# Patient Record
Sex: FEMALE | Race: WHITE | NOT HISPANIC OR LATINO | Employment: FULL TIME | URBAN - METROPOLITAN AREA
[De-identification: names, ages, dates, MRNs, and addresses within clinical notes are randomized per-mention and may not be internally consistent; named-entity substitution may affect disease eponyms.]

---

## 2018-04-05 ENCOUNTER — TELEPHONE (OUTPATIENT)
Dept: FAMILY MEDICINE | Facility: CLINIC | Age: 43
End: 2018-04-05

## 2018-06-12 ENCOUNTER — HOSPITAL ENCOUNTER (EMERGENCY)
Facility: CLINIC | Age: 43
Discharge: HOME | End: 2018-06-12
Attending: PHYSICIAN ASSISTANT | Admitting: PHYSICIAN ASSISTANT
Payer: COMMERCIAL

## 2018-06-12 VITALS
HEART RATE: 80 BPM | SYSTOLIC BLOOD PRESSURE: 128 MMHG | WEIGHT: 139 LBS | TEMPERATURE: 98.4 F | BODY MASS INDEX: 23.13 KG/M2 | RESPIRATION RATE: 14 BRPM | OXYGEN SATURATION: 99 % | DIASTOLIC BLOOD PRESSURE: 92 MMHG

## 2018-06-12 DIAGNOSIS — N39.0 URINARY TRACT INFECTION WITH HEMATURIA, SITE UNSPECIFIED: Primary | ICD-10-CM

## 2018-06-12 DIAGNOSIS — R31.9 URINARY TRACT INFECTION WITH HEMATURIA, SITE UNSPECIFIED: Primary | ICD-10-CM

## 2018-06-12 LAB
ALBUMIN UR-MCNC: NEGATIVE MG/DL
APPEARANCE UR: ABNORMAL
BACTERIA #/AREA URNS HPF: ABNORMAL /HPF
BILIRUB UR QL STRIP: NEGATIVE
COLOR UR AUTO: YELLOW
GLUCOSE UR STRIP-MCNC: NEGATIVE MG/DL
HGB UR QL STRIP: ABNORMAL
KETONES UR STRIP-MCNC: NEGATIVE MG/DL
LEUKOCYTE ESTERASE UR QL STRIP: ABNORMAL
MUCOUS THREADS #/AREA URNS LPF: PRESENT /LPF
NITRATE UR QL: NEGATIVE
PH UR STRIP: 6 PH (ref 5–7)
RBC #/AREA URNS AUTO: 3 /HPF (ref 0–2)
SOURCE: ABNORMAL
SP GR UR STRIP: 1 (ref 1–1.03)
SQUAMOUS #/AREA URNS AUTO: <1 /HPF (ref 0–1)
UROBILINOGEN UR STRIP-MCNC: 0 MG/DL (ref 0–2)
WBC #/AREA URNS AUTO: 125 /HPF (ref 0–5)
WBC CLUMPS #/AREA URNS HPF: PRESENT /HPF

## 2018-06-12 PROCEDURE — 99214 OFFICE O/P EST MOD 30 MIN: CPT | Mod: Z6 | Performed by: PHYSICIAN ASSISTANT

## 2018-06-12 PROCEDURE — 87086 URINE CULTURE/COLONY COUNT: CPT | Performed by: PHYSICIAN ASSISTANT

## 2018-06-12 PROCEDURE — G0463 HOSPITAL OUTPT CLINIC VISIT: HCPCS | Performed by: PHYSICIAN ASSISTANT

## 2018-06-12 PROCEDURE — 87186 SC STD MICRODIL/AGAR DIL: CPT | Performed by: PHYSICIAN ASSISTANT

## 2018-06-12 PROCEDURE — 87088 URINE BACTERIA CULTURE: CPT | Performed by: PHYSICIAN ASSISTANT

## 2018-06-12 PROCEDURE — 81001 URINALYSIS AUTO W/SCOPE: CPT | Performed by: PHYSICIAN ASSISTANT

## 2018-06-12 RX ORDER — SULFAMETHOXAZOLE/TRIMETHOPRIM 800-160 MG
1 TABLET ORAL 2 TIMES DAILY
Qty: 14 TABLET | Refills: 0 | Status: SHIPPED | OUTPATIENT
Start: 2018-06-12 | End: 2018-06-19

## 2018-06-12 RX ORDER — PHENAZOPYRIDINE HYDROCHLORIDE 200 MG/1
200 TABLET, FILM COATED ORAL 3 TIMES DAILY
Qty: 9 TABLET | Refills: 0 | Status: SHIPPED | OUTPATIENT
Start: 2018-06-12 | End: 2018-06-15

## 2018-06-12 RX ORDER — FLUCONAZOLE 150 MG/1
TABLET ORAL
Qty: 2 TABLET | Refills: 0 | Status: SHIPPED | OUTPATIENT
Start: 2018-06-12 | End: 2018-06-15

## 2018-06-12 NOTE — ED AVS SNAPSHOT
[facility] Emergency Department    [address] [facility] [address]    Phone:  [phone]    Fax:  [phone]                                       Brad Leo   MRN: [id number]    Department:  [facility] Emergency Department   Date of Visit:  6/12/2018           Patient Information     Date Of Birth          1975        Your diagnoses for this visit were:     Urinary tract infection with hematuria, site unspecified        You were seen by Kiki Brewer PA-C.      Follow-up Information     Follow up with [facility]. Call in 5 days.    Specialty:  Family Practice    Why:  As needed, For persistent symptoms    Contact information:    [address] [facility] [address] [phone]    Additional information:    The medical center is located at   [address] [place] [address] (between [facility] and   [facility] [address] [place] [address], four miles north   of [place]).        Follow up with [facility] Emergency Department.    Specialty:  EMERGENCY MEDICINE    Why:  As needed, If symptoms worsen    Contact information:    [address] [facility] [address] [phone]    Additional information:    The medical center is located at   [address] [place] [address] (between [facility] and   [address], four miles north   of [place]).        Discharge Instructions         Urinary Tract Infections in Women    Urinary tract infections (UTIs) are most often caused by bacteria. These bacteria enter the urinary tract. The bacteria may come from outside the body. Or they may travel from the skin outside the rectum or vagina into the urethra. Female anatomy makes it easy for bacteria from the bowel to enter a woman s urinary tract, which is the most common source of UTI. This means women develop UTIs more often than men. Pain in or around the urinary tract is a common UTI symptom. But the only way to know for sure if you have a UTI for the healthcare provider to test your  urine. The two tests that may be done are the urinalysis and urine culture.  Types of UTIs    Cystitis. A bladder infection (cystitis) is the most common UTI in women. You may have urgent or frequent urination. You may also have pain, burning when you urinate, and bloody urine.    Urethritis. This is an inflamed urethra, which is the tube that carries urine from the bladder to outside the body. You may have lower stomach or back pain. You may also have urgent or frequent urination.    Pyelonephritis. This is a kidney infection. If not treated, it can be serious and damage your kidneys. In severe cases, you may need to stay in the hospital. You may have a fever and lower back pain.  Medicines to treat a UTI  Most UTIs are treated with antibiotics. These kill the bacteria. The length of time you need to take them depends on the type of infection. It may be as short as 3 days. If you have repeated UTIs, you may need a low-dose antibiotic for several months. Take antibiotics exactly as directed. Don t stop taking them until all of the medicine is gone. If you stop taking the antibiotic too soon, the infection may not go away. You may also develop a resistance to the antibiotic. This can make it much harder to treat.  Lifestyle changes to treat and prevent UTIs  The lifestyle changes below will help get rid of your UTI. They may also help prevent future UTIs.    Drink plenty of fluids. This includes water, juice, or other caffeine-free drinks. Fluids help flush bacteria out of your body.    Empty your bladder. Always empty your bladder when you feel the urge to urinate. And always urinate before going to sleep. Urine that stays in your bladder can lead to infection. Try to urinate before and after sex as well.    Practice good personal hygiene. Wipe yourself from front to back after using the toilet. This helps keep bacteria from getting into the urethra.    Use condoms during sex. These help prevent UTIs caused by  sexually transmitted bacteria. Also don't use spermicides during sex. These can increase the risk for UTIs. Choose other forms of birth control instead. For women who tend to get UTIs after sex, a low-dose of a preventive antibiotic may be used. Be sure to discuss this option with your healthcare provider.    Follow up with your healthcare provider as directed. He or she may test to make sure the infection has cleared. If needed, more treatment may be started.  Date Last Reviewed: 1/1/2017 2000-2017 [employer]. [address] [place] [address]. All rights reserved. This information is not intended as a substitute for professional medical care. Always follow your healthcare professional's instructions.          24 Hour Appointment Hotline       To make an appointment at any [facility], call [phone] ([phone]). If you don't have a family doctor or clinic, we will help you find one. [facility] clinics are conveniently located to serve the needs of you and your family.             Review of your medicines      START taking        Dose / Directions Last dose taken    fluconazole 150 MG tablet   Commonly known as:  DIFLUCAN   Quantity:  2 tablet        Take one tablet now, and one tablet in three days   Refills:  0        phenazopyridine 200 MG tablet   Commonly known as:  PYRIDIUM   Dose:  200 mg   Quantity:  9 tablet        Take 1 tablet (200 mg) by mouth 3 times daily for 3 days   Refills:  0        sulfamethoxazole-trimethoprim 800-160 MG per tablet   Commonly known as:  BACTRIM DS   Dose:  1 tablet   Quantity:  14 tablet        Take 1 tablet by mouth 2 times daily for 7 days   Refills:  0          Our records show that you are taking the medicines listed below. If these are incorrect, please call your family doctor or clinic.        Dose / Directions Last dose taken    LORAZEPAM PO   Dose:  1 mg        Take 1 mg by mouth every 8 hours as needed for anxiety   Refills:  0                 Prescriptions were sent or printed at these locations (3 Prescriptions)                   [facility] [address] [facility] [employer] - [place], [place] - [facility] [address] [facility]   [address] [facility] [address]    Telephone:  [phone]   Fax:  [phone]   Hours:                  E-Prescribed (3 of 3)         fluconazole (DIFLUCAN) 150 MG tablet               phenazopyridine (PYRIDIUM) 200 MG tablet               sulfamethoxazole-trimethoprim (BACTRIM DS) 800-160 MG per tablet                Procedures and tests performed during your visit     UA with Microscopic    Urine Culture      Orders Needing Specimen Collection     None      Pending Results     Date and Time Order Name Status Description    6/12/2018 1806 Urine Culture In process             Pending Culture Results     Date and Time Order Name Status Description    6/12/2018 1806 Urine Culture In process             Pending Results Instructions     If you had any lab results that were not finalized at the time of your Discharge, you can call the ED Lab Result RN at [phone]. You will be contacted by this team for any positive Lab results or changes in treatment. The nurses are available 7 days a week from 10A to 6:30P.  You can leave a message 24 hours per day and they will return your call.        Test Results From Your Hospital Stay        6/12/2018  6:12 PM         6/12/2018  6:43 PM      Component Results     Component Value Ref Range & Units Status    Color Urine Yellow  Final    Appearance Urine Slightly Cloudy  Final    Glucose Urine Negative NEG^Negative mg/dL Final    Bilirubin Urine Negative NEG^Negative Final    Ketones Urine Negative NEG^Negative mg/dL Final    Specific Gravity Urine 1.004 1.003 - 1.035 Final    Blood Urine Large (A) NEG^Negative Final    pH Urine 6.0 5.0 - 7.0 pH Final    Protein Albumin Urine Negative NEG^Negative mg/dL Final    Urobilinogen mg/dL 0.0 0.0 - 2.0 mg/dL Final    Nitrite Urine Negative NEG^Negative Final     "Leukocyte Esterase Urine Large (A) NEG^Negative Final    Source Midstream Urine  Final    WBC Urine 125 (H) 0 - 5 /HPF Final    RBC Urine 3 (H) 0 - 2 /HPF Final    WBC Clumps Present (A) NEG^Negative /HPF Final    Bacteria Urine Few (A) NEG^Negative /HPF Final    Squamous Epithelial /HPF Urine <1 0 - 1 /HPF Final    Mucous Urine Present (A) NEG^Negative /LPF Final                Thank you for choosing [facility]       Thank you for choosing [facility] for your care. Our goal is always to provide you with excellent care. Hearing back from our patients is one way we can continue to improve our services. Please take a few minutes to complete the written survey that you may receive in the mail after you visit with us. Thank you!        [employer] Information     [employer] lets you send messages to your doctor, view your test results, renew your prescriptions, schedule appointments and more. To sign up, go to [email or web address] . Click on \"Log in\" on the left side of the screen, which will take you to the Welcome page. Then click on \"Sign up Now\" on the right side of the page.     You will be asked to enter the access code listed below, as well as some personal information. Please follow the directions to create your username and password.     Your access code is: [email or web address]  Expires: 9/10/2018  7:41 PM     Your access code will  in 90 days. If you need help or a new code, please call your [facility] clinic or [phone].        Care EveryWhere ID     This is your Care EveryWhere ID. This could be used by other organizations to access your [facility] medical records  [id number]        Equal Access to Services     [facility]: Hadii shayla kamara Sovelma, waaxda luqadaha, qaybgus kaalmada erwin, gonzalo whitehead. So [facility] [phone].    ATENCIÓN: Si habla español, tiene a culver disposición servicios gratuitos de asistencia lingüística. Llame al [phone].    We comply with applicable " federal civil rights laws and [place] laws. We do not discriminate on the basis of race, color, national origin, age, disability, sex, sexual orientation, or gender identity.            After Visit Summary       This is your record. Keep this with you and show to your community pharmacist(s) and doctor(s) at your next visit.

## 2018-06-12 NOTE — ED AVS SNAPSHOT
[facility] Emergency Department    [address] [facility] [address]    Phone:  [phone]    Fax:  [phone]                                       Brad Leo   MRN: [id number]    Department:  [facility] Emergency Department   Date of Visit:  6/12/2018           After Visit Summary Signature Page     I have received my discharge instructions, and my questions have been answered. I have discussed any challenges I see with this plan with the nurse or doctor.    ..........................................................................................................................................  Patient/Patient Representative Signature      ..........................................................................................................................................  Patient Representative Print Name and Relationship to Patient    ..................................................               ................................................  Date                                            Time    ..........................................................................................................................................  Reviewed by Signature/Title    ...................................................              ..............................................  Date                                                            Time

## 2018-06-13 ASSESSMENT — ENCOUNTER SYMPTOMS
DYSURIA: 1
ABDOMINAL PAIN: 1
CHILLS: 0
HEMATURIA: 1
FEVER: 0
CONSTITUTIONAL NEGATIVE: 1
FREQUENCY: 1
MUSCULOSKELETAL NEGATIVE: 1

## 2018-06-13 NOTE — ED PROVIDER NOTES
History     Chief Complaint   Patient presents with     UTI     HPI  Brad Leo is a 43 year old female who presents with complaints of dysuria, hematuria, and increased urinary urgency and frequency that started today.  Patient reports having history of urinary tract infections and the symptoms feel similar.  He also complains of some mild suprapubic discomfort.  She denies fevers, chills, nausea, vomiting, or back or flank pain.  Patient has history of hysterectomy.      Problem List:    Patient Active Problem List    Diagnosis Date Noted     Hx MRSA infection 11/03/2014     Priority: Medium     S/P hysterectomy 11/03/2014     Priority: Medium     benign          Past Medical History:    No past medical history on file.    Past Surgical History:    Past Surgical History:   Procedure Laterality Date     HYSTERECTOMY  ?    partial       Family History:    Family History   Problem Relation Age of Onset     Unknown/Adopted Father      Cancer - colorectal Maternal Grandmother      Hypertension Maternal Grandmother      Unknown/Adopted Paternal Grandmother      Unknown/Adopted Paternal Grandfather        Social History:  Marital Status:   [2]  Social History   Substance Use Topics     Smoking status: Former Smoker     Smokeless tobacco: Never Used     Alcohol use Yes        Medications:      fluconazole (DIFLUCAN) 150 MG tablet   phenazopyridine (PYRIDIUM) 200 MG tablet   sulfamethoxazole-trimethoprim (BACTRIM DS) 800-160 MG per tablet   LORAZEPAM PO         Review of Systems   Constitutional: Negative.  Negative for chills and fever.   Gastrointestinal: Positive for abdominal pain (suprapubic).   Genitourinary: Positive for dysuria, frequency, hematuria and urgency.   Musculoskeletal: Negative.    Skin: Negative.    All other systems reviewed and are negative.      Physical Exam   BP: (!) 128/92  Pulse: 80  Temp: 98.4  F (36.9  C)  Resp: 14  Weight: 63 kg (139 lb)  SpO2: 99 %      Physical Exam    Constitutional: She appears well-developed and well-nourished. No distress.   HENT:   Head: Normocephalic and atraumatic.   Pulmonary/Chest: Effort normal.   Abdominal: Soft. She exhibits no distension. There is no tenderness. There is no rebound, no guarding and no CVA tenderness.   Neurological: She is alert.   Skin: Skin is warm and dry.       ED Course     ED Course     Procedures      Results for orders placed or performed during the hospital encounter of 06/12/18 (from the past 24 hour(s))   Urine Culture   Result Value Ref Range    Specimen Description Midstream Urine     Special Requests Specimen received in preservative     Culture Micro (A)      >100,000 colonies/mL  Lactose fermenting gram negative rods      Culture Micro Susceptibility testing in progress    UA with Microscopic   Result Value Ref Range    Color Urine Yellow     Appearance Urine Slightly Cloudy     Glucose Urine Negative NEG^Negative mg/dL    Bilirubin Urine Negative NEG^Negative    Ketones Urine Negative NEG^Negative mg/dL    Specific Gravity Urine 1.004 1.003 - 1.035    Blood Urine Large (A) NEG^Negative    pH Urine 6.0 5.0 - 7.0 pH    Protein Albumin Urine Negative NEG^Negative mg/dL    Urobilinogen mg/dL 0.0 0.0 - 2.0 mg/dL    Nitrite Urine Negative NEG^Negative    Leukocyte Esterase Urine Large (A) NEG^Negative    Source Midstream Urine     WBC Urine 125 (H) 0 - 5 /HPF    RBC Urine 3 (H) 0 - 2 /HPF    WBC Clumps Present (A) NEG^Negative /HPF    Bacteria Urine Few (A) NEG^Negative /HPF    Squamous Epithelial /HPF Urine <1 0 - 1 /HPF    Mucous Urine Present (A) NEG^Negative /LPF       Medications - No data to display    Assessments & Plan (with Medical Decision Making)     Pt is a 43 year old female who presents with complaints of dysuria, hematuria, and increased urinary urgency and frequency that started today.  Patient reports having history of urinary tract infections and the symptoms feel similar.  He also complains of some mild  suprapubic discomfort.  Pt is afebrile on arrival.  Exam as above.  UA was positive for large leuk esterase, 125 WBCs, WBC clumps.  Urine was sent for culture.  Discussed results with patient.  Return precautions were reviewed.  Hand-outs were provided.    Patient was sent with Bactrim, Pyridium, and Diflucan (per patient's request that she frequently gets a yeast infection with antibiotic usage) and was instructed to follow-up with PCP if no improvement in 5-7 days for continued care and management or sooner if new or worsening symptoms.  She is to return to the ED for persistent and/or worsening symptoms.  Patient expressed understanding of the diagnosis and plan and was discharged home in good condition.    I have reviewed the nursing notes.    I have reviewed the findings, diagnosis, plan and need for follow up with the patient.    Discharge Medication List as of 6/12/2018  7:41 PM      START taking these medications    Details   fluconazole (DIFLUCAN) 150 MG tablet Take one tablet now, and one tablet in three days, Disp-2 tablet, R-0, E-Prescribe      phenazopyridine (PYRIDIUM) 200 MG tablet Take 1 tablet (200 mg) by mouth 3 times daily for 3 days, Disp-9 tablet, R-0, E-Prescribe      sulfamethoxazole-trimethoprim (BACTRIM DS) 800-160 MG per tablet Take 1 tablet by mouth 2 times daily for 7 days, Disp-14 tablet, R-0, E-Prescribe             Final diagnoses:   Urinary tract infection with hematuria, site unspecified       6/12/2018   [facility] EMERGENCY DEPARTMENT     Kiki Brewer PA-C  06/13/18 1300

## 2018-06-13 NOTE — DISCHARGE INSTRUCTIONS
Urinary Tract Infections in Women    Urinary tract infections (UTIs) are most often caused by bacteria. These bacteria enter the urinary tract. The bacteria may come from outside the body. Or they may travel from the skin outside the rectum or vagina into the urethra. Female anatomy makes it easy for bacteria from the bowel to enter a woman s urinary tract, which is the most common source of UTI. This means women develop UTIs more often than men. Pain in or around the urinary tract is a common UTI symptom. But the only way to know for sure if you have a UTI for the healthcare provider to test your urine. The two tests that may be done are the urinalysis and urine culture.  Types of UTIs    Cystitis. A bladder infection (cystitis) is the most common UTI in women. You may have urgent or frequent urination. You may also have pain, burning when you urinate, and bloody urine.    Urethritis. This is an inflamed urethra, which is the tube that carries urine from the bladder to outside the body. You may have lower stomach or back pain. You may also have urgent or frequent urination.    Pyelonephritis. This is a kidney infection. If not treated, it can be serious and damage your kidneys. In severe cases, you may need to stay in the hospital. You may have a fever and lower back pain.  Medicines to treat a UTI  Most UTIs are treated with antibiotics. These kill the bacteria. The length of time you need to take them depends on the type of infection. It may be as short as 3 days. If you have repeated UTIs, you may need a low-dose antibiotic for several months. Take antibiotics exactly as directed. Don t stop taking them until all of the medicine is gone. If you stop taking the antibiotic too soon, the infection may not go away. You may also develop a resistance to the antibiotic. This can make it much harder to treat.  Lifestyle changes to treat and prevent UTIs  The lifestyle changes below will help get rid of your UTI. They  may also help prevent future UTIs.    Drink plenty of fluids. This includes water, juice, or other caffeine-free drinks. Fluids help flush bacteria out of your body.    Empty your bladder. Always empty your bladder when you feel the urge to urinate. And always urinate before going to sleep. Urine that stays in your bladder can lead to infection. Try to urinate before and after sex as well.    Practice good personal hygiene. Wipe yourself from front to back after using the toilet. This helps keep bacteria from getting into the urethra.    Use condoms during sex. These help prevent UTIs caused by sexually transmitted bacteria. Also don't use spermicides during sex. These can increase the risk for UTIs. Choose other forms of birth control instead. For women who tend to get UTIs after sex, a low-dose of a preventive antibiotic may be used. Be sure to discuss this option with your healthcare provider.    Follow up with your healthcare provider as directed. He or she may test to make sure the infection has cleared. If needed, more treatment may be started.  Date Last Reviewed: 1/1/2017 2000-2017 [employer]. [address] [place] [address]. All rights reserved. This information is not intended as a substitute for professional medical care. Always follow your healthcare professional's instructions.

## 2018-06-14 LAB
BACTERIA SPEC CULT: ABNORMAL
Lab: ABNORMAL
SPECIMEN SOURCE: ABNORMAL

## 2018-06-16 ENCOUNTER — TELEPHONE (OUTPATIENT)
Dept: EMERGENCY MEDICINE | Facility: CLINIC | Age: 43
End: 2018-06-16

## 2018-06-16 NOTE — TELEPHONE ENCOUNTER
[facility] Emergency Department Lab result notification:    [facility] ED lab result protocol used  Urine Culture Protocol    Reason for call  Notify of lab results, assess symptoms,  review ED providers recommendations/discharge instructions (if necessary) and advise per ED lab result f/u protocol    Lab Result  Final Urine Culture Report on 6/14/18  Emergency Dept discharge antibiotic prescribed: Sulfamethoxazole-Trimethoprim (Bactrim DS, Septra DS) 800-160 mg PO tablet, 1 tablet by mouth 2 times daily for 7 days  #1. Bacteria, >100,000 colonies/mL Escherichia coli, is SUSCEPTIBLE to Antibiotic.    As per [facility] ED Lab Result protocol, no change in antibiotic therapy.  Information table from ED Provider visit on 06/12/2018  Symptoms reported at ED visit (Chief complaint, HPI) a 43 year old female who presents with complaints of dysuria, hematuria, and increased urinary urgency and frequency that started today.  Patient reports having history of urinary tract infections and the symptoms feel similar.  He also complains of some mild suprapubic discomfort.  She denies fevers, chills, nausea, vomiting, or back or flank pain.  Patient has history of hysterectomy.   ED providers Impression and Plan (applicable information) a 43 year old female who presents with complaints of dysuria, hematuria, and increased urinary urgency and frequency that started today.  Patient reports having history of urinary tract infections and the symptoms feel similar.  He also complains of some mild suprapubic discomfort.  Pt is afebrile on arrival.  Exam as above.  UA was positive for large leuk esterase, 125 WBCs, WBC clumps.  Urine was sent for culture.  Discussed results with patient.  Return precautions were reviewed.  Hand-outs were provided.     Patient was sent with Bactrim, Pyridium, and Diflucan (per patient's request that she frequently gets a yeast infection with antibiotic usage) and was instructed to follow-up with PCP if  no improvement in 5-7 days for continued care and management or sooner if new or worsening symptoms.  She is to return to the ED for persistent and/or worsening symptoms.  Patient expressed understanding of the diagnosis and plan and was discharged home in good condition   Miscellaneous information Follow up with [facility]. Call in 5 days. As needed and for persistent symptoms.      RN Assessment (Patient s current Symptoms), include time called.  [Insert Left message here if message left]  At 1449 pt notes improvement in her symptoms highlighted above.   RN Recommendations/Instructions per [facility] ED lab result protocol  Continue antibiotics as prescribed and     Please Contact your PCP clinic or return to the Emergency department if your:    Symptoms worsen or other concerning symptom's.    PCP follow-up Questions asked: NO    Wilma Brown RN  [facility] RN  Lung Nodule and ED Lab Result F/u RN  Epic pool (ED late result f/u RN): [id number]  FV INCIDENTAL RADIOLOGY F/U NURSES: P [id number]  # [phone]      Copy of Lab result   Exam Information   Exam Date Exam Time Accession # Results    6/12/18  6:06 PM [id number]    Component Results   Component Collected Lab   Specimen Description 06/12/2018  6:06    Midstream Urine   Special Requests 06/12/2018  6:06 PM 75   Specimen received in preservative   Culture Micro (Abnormal) 06/12/2018  6:06    >100,000 colonies/mL   Escherichia coli      Culture & Susceptibility   ESCHERICHIA COLI   Antibiotic Interpretation Sensitivity Unit Method Status   AMPICILLIN Sensitive 8 ug/mL ANUPAMA Final   AMPICILLIN/SULBACTAM Sensitive 4 ug/mL ANUPAMA Final   CEFAZOLIN Sensitive <=4 ug/mL ANUPAMA Final   Comment: Cefazolin ANUPAMA breakpoints are for the treatment of uncomplicated urinary tract   infections.  For the treatment of systemic infections, please contact the   laboratory for additional testing.   CEFEPIME Sensitive <=1 ug/mL ANUPAMA Final    CEFOXITIN Sensitive 8 ug/mL ANUPAMA Final   CEFTAZIDIME Sensitive <=1 ug/mL ANUPAMA Final   CEFTRIAXONE Sensitive <=1 ug/mL ANUPAMA Final   CIPROFLOXACIN Sensitive <=0.25 ug/mL ANUPAMA Final   GENTAMICIN Sensitive <=1 ug/mL ANUPAMA Final   LEVOFLOXACIN Sensitive <=0.12 ug/mL ANUPAMA Final   NITROFURANTOIN Sensitive <=16 ug/mL ANUPAMA Final   Piperacillin/Tazo Sensitive <=4 ug/mL ANUPAMA Final   TOBRAMYCIN Sensitive <=1 ug/mL ANUPAMA Final   Trimethoprim/Sulfa Sensitive <=1/19 ug/mL ANUPAMA Final

## 2021-05-31 ENCOUNTER — RECORDS - HEALTHEAST (OUTPATIENT)
Dept: ADMINISTRATIVE | Facility: CLINIC | Age: 46
End: 2021-05-31

## 2021-08-28 ENCOUNTER — HOSPITAL ENCOUNTER (OUTPATIENT)
Facility: AMBULATORY SURGERY CENTER | Age: 46
End: 2021-08-28
Attending: OBSTETRICS & GYNECOLOGY
Payer: COMMERCIAL

## 2021-08-29 DIAGNOSIS — Z11.59 ENCOUNTER FOR SCREENING FOR OTHER VIRAL DISEASES: ICD-10-CM

## 2021-09-10 ENCOUNTER — LAB (OUTPATIENT)
Dept: LAB | Facility: CLINIC | Age: 46
End: 2021-09-10
Attending: OBSTETRICS & GYNECOLOGY
Payer: COMMERCIAL

## 2021-09-10 DIAGNOSIS — Z11.59 ENCOUNTER FOR SCREENING FOR OTHER VIRAL DISEASES: ICD-10-CM

## 2021-09-10 PROCEDURE — U0005 INFEC AGEN DETEC AMPLI PROBE: HCPCS

## 2021-09-10 PROCEDURE — U0003 INFECTIOUS AGENT DETECTION BY NUCLEIC ACID (DNA OR RNA); SEVERE ACUTE RESPIRATORY SYNDROME CORONAVIRUS 2 (SARS-COV-2) (CORONAVIRUS DISEASE [COVID-19]), AMPLIFIED PROBE TECHNIQUE, MAKING USE OF HIGH THROUGHPUT TECHNOLOGIES AS DESCRIBED BY CMS-2020-01-R: HCPCS

## 2021-09-10 RX ORDER — CELECOXIB 200 MG/1
CAPSULE ORAL
COMMUNITY
Start: 2021-08-30

## 2021-09-10 RX ORDER — GABAPENTIN 300 MG/1
CAPSULE ORAL
COMMUNITY
Start: 2021-07-15

## 2021-09-10 RX ORDER — NARATRIPTAN 2.5 MG/1
2.5 TABLET ORAL
COMMUNITY
Start: 2020-07-09

## 2021-09-10 RX ORDER — FLUCONAZOLE 150 MG/1
TABLET ORAL
COMMUNITY
Start: 2021-09-07

## 2021-09-10 RX ORDER — METHYLPREDNISOLONE 4 MG
TABLET, DOSE PACK ORAL SEE ADMIN INSTRUCTIONS
COMMUNITY
Start: 2021-08-30

## 2021-09-10 RX ORDER — TIZANIDINE 2 MG/1
TABLET ORAL
COMMUNITY
Start: 2021-08-30

## 2021-09-10 RX ORDER — LORAZEPAM 0.5 MG/1
TABLET ORAL
COMMUNITY
Start: 2021-04-26

## 2021-09-10 RX ORDER — FAMCICLOVIR 500 MG/1
TABLET ORAL
COMMUNITY
Start: 2021-09-09

## 2021-09-10 RX ORDER — CYCLOBENZAPRINE HCL 10 MG
TABLET ORAL
COMMUNITY
Start: 2021-02-04

## 2021-09-10 RX ORDER — ESTRADIOL 10 UG/1
INSERT VAGINAL
COMMUNITY
Start: 2021-06-29

## 2021-09-10 RX ORDER — ECONAZOLE NITRATE 10 MG/G
CREAM TOPICAL
COMMUNITY
Start: 2020-01-19

## 2021-09-10 RX ORDER — OXYBUTYNIN CHLORIDE 5 MG/1
5 TABLET, EXTENDED RELEASE ORAL DAILY
COMMUNITY
Start: 2021-08-26 | End: 2021-09-14

## 2021-09-11 LAB — SARS-COV-2 RNA RESP QL NAA+PROBE: NEGATIVE

## 2021-09-13 ENCOUNTER — ANESTHESIA EVENT (OUTPATIENT)
Dept: SURGERY | Facility: AMBULATORY SURGERY CENTER | Age: 46
End: 2021-09-13
Payer: COMMERCIAL

## 2021-09-14 ENCOUNTER — ANESTHESIA (OUTPATIENT)
Dept: SURGERY | Facility: AMBULATORY SURGERY CENTER | Age: 46
End: 2021-09-14
Payer: COMMERCIAL

## 2021-09-14 ENCOUNTER — HOSPITAL ENCOUNTER (OUTPATIENT)
Facility: AMBULATORY SURGERY CENTER | Age: 46
End: 2021-09-14
Attending: OBSTETRICS & GYNECOLOGY
Payer: COMMERCIAL

## 2021-09-14 VITALS
TEMPERATURE: 96.9 F | DIASTOLIC BLOOD PRESSURE: 84 MMHG | SYSTOLIC BLOOD PRESSURE: 118 MMHG | HEART RATE: 80 BPM | OXYGEN SATURATION: 97 % | RESPIRATION RATE: 16 BRPM

## 2021-09-14 DIAGNOSIS — G89.18 POSTOPERATIVE PAIN: Primary | ICD-10-CM

## 2021-09-14 DEVICE — SYSTEM, SLING, SINGLE INCISION, ALTIS 5196502400: Type: IMPLANTABLE DEVICE | Site: BLADDER | Status: FUNCTIONAL

## 2021-09-14 RX ORDER — SODIUM CHLORIDE, SODIUM LACTATE, POTASSIUM CHLORIDE, CALCIUM CHLORIDE 600; 310; 30; 20 MG/100ML; MG/100ML; MG/100ML; MG/100ML
INJECTION, SOLUTION INTRAVENOUS CONTINUOUS
Status: DISCONTINUED | OUTPATIENT
Start: 2021-09-14 | End: 2021-09-15 | Stop reason: HOSPADM

## 2021-09-14 RX ORDER — FENTANYL CITRATE 50 UG/ML
50 INJECTION, SOLUTION INTRAMUSCULAR; INTRAVENOUS EVERY 5 MIN PRN
Status: DISCONTINUED | OUTPATIENT
Start: 2021-09-14 | End: 2021-09-15 | Stop reason: HOSPADM

## 2021-09-14 RX ORDER — PROPOFOL 10 MG/ML
INJECTION, EMULSION INTRAVENOUS CONTINUOUS PRN
Status: DISCONTINUED | OUTPATIENT
Start: 2021-09-14 | End: 2021-09-14

## 2021-09-14 RX ORDER — ACETAMINOPHEN 325 MG/1
975 TABLET ORAL ONCE
Status: COMPLETED | OUTPATIENT
Start: 2021-09-14 | End: 2021-09-14

## 2021-09-14 RX ORDER — IBUPROFEN 800 MG/1
800 TABLET, FILM COATED ORAL ONCE
Status: DISCONTINUED | OUTPATIENT
Start: 2021-09-14 | End: 2021-09-15 | Stop reason: HOSPADM

## 2021-09-14 RX ORDER — ONDANSETRON 2 MG/ML
4 INJECTION INTRAMUSCULAR; INTRAVENOUS EVERY 30 MIN PRN
Status: DISCONTINUED | OUTPATIENT
Start: 2021-09-14 | End: 2021-09-15 | Stop reason: HOSPADM

## 2021-09-14 RX ORDER — HYDROXYZINE HYDROCHLORIDE 10 MG/1
10 TABLET, FILM COATED ORAL 3 TIMES DAILY PRN
Status: DISCONTINUED | OUTPATIENT
Start: 2021-09-14 | End: 2021-09-15 | Stop reason: HOSPADM

## 2021-09-14 RX ORDER — MEPERIDINE HYDROCHLORIDE 25 MG/ML
12.5 INJECTION INTRAMUSCULAR; INTRAVENOUS; SUBCUTANEOUS
Status: DISCONTINUED | OUTPATIENT
Start: 2021-09-14 | End: 2021-09-15 | Stop reason: HOSPADM

## 2021-09-14 RX ORDER — ONDANSETRON 4 MG/1
4 TABLET, ORALLY DISINTEGRATING ORAL EVERY 30 MIN PRN
Status: DISCONTINUED | OUTPATIENT
Start: 2021-09-14 | End: 2021-09-15 | Stop reason: HOSPADM

## 2021-09-14 RX ORDER — IBUPROFEN 600 MG/1
600 TABLET, FILM COATED ORAL EVERY 6 HOURS PRN
Qty: 30 TABLET | Refills: 0 | COMMUNITY
Start: 2021-09-14

## 2021-09-14 RX ORDER — LIDOCAINE HYDROCHLORIDE AND EPINEPHRINE 10; 10 MG/ML; UG/ML
INJECTION, SOLUTION INFILTRATION; PERINEURAL PRN
Status: DISCONTINUED | OUTPATIENT
Start: 2021-09-14 | End: 2021-09-14 | Stop reason: HOSPADM

## 2021-09-14 RX ORDER — LIDOCAINE HYDROCHLORIDE 10 MG/ML
INJECTION, SOLUTION EPIDURAL; INFILTRATION; INTRACAUDAL; PERINEURAL PRN
Status: DISCONTINUED | OUTPATIENT
Start: 2021-09-14 | End: 2021-09-14 | Stop reason: HOSPADM

## 2021-09-14 RX ORDER — GLYCOPYRROLATE 0.2 MG/ML
INJECTION, SOLUTION INTRAMUSCULAR; INTRAVENOUS PRN
Status: DISCONTINUED | OUTPATIENT
Start: 2021-09-14 | End: 2021-09-14

## 2021-09-14 RX ORDER — LIDOCAINE 40 MG/G
CREAM TOPICAL
Status: DISCONTINUED | OUTPATIENT
Start: 2021-09-14 | End: 2021-09-15 | Stop reason: HOSPADM

## 2021-09-14 RX ORDER — TRAMADOL HYDROCHLORIDE 50 MG/1
50 TABLET ORAL EVERY 6 HOURS PRN
Qty: 10 TABLET | Refills: 0 | Status: SHIPPED | OUTPATIENT
Start: 2021-09-14 | End: 2021-09-17

## 2021-09-14 RX ORDER — ACETAMINOPHEN 325 MG/1
650 TABLET ORAL EVERY 6 HOURS PRN
Qty: 30 TABLET | Refills: 0 | COMMUNITY
Start: 2021-09-14

## 2021-09-14 RX ORDER — FENTANYL CITRATE 50 UG/ML
50 INJECTION, SOLUTION INTRAMUSCULAR; INTRAVENOUS
Status: DISCONTINUED | OUTPATIENT
Start: 2021-09-14 | End: 2021-09-15 | Stop reason: HOSPADM

## 2021-09-14 RX ORDER — ACETAMINOPHEN 325 MG/1
650 TABLET ORAL ONCE
Status: DISCONTINUED | OUTPATIENT
Start: 2021-09-14 | End: 2021-09-15 | Stop reason: HOSPADM

## 2021-09-14 RX ORDER — MAGNESIUM SULFATE HEPTAHYDRATE 40 MG/ML
4 INJECTION, SOLUTION INTRAVENOUS ONCE
Status: COMPLETED | OUTPATIENT
Start: 2021-09-14 | End: 2021-09-14

## 2021-09-14 RX ORDER — ONDANSETRON 2 MG/ML
INJECTION INTRAMUSCULAR; INTRAVENOUS PRN
Status: DISCONTINUED | OUTPATIENT
Start: 2021-09-14 | End: 2021-09-14

## 2021-09-14 RX ORDER — CEFAZOLIN SODIUM 2 G/100ML
2 INJECTION, SOLUTION INTRAVENOUS SEE ADMIN INSTRUCTIONS
Status: DISCONTINUED | OUTPATIENT
Start: 2021-09-14 | End: 2021-09-15 | Stop reason: HOSPADM

## 2021-09-14 RX ORDER — DEXAMETHASONE SODIUM PHOSPHATE 10 MG/ML
INJECTION, SOLUTION INTRAMUSCULAR; INTRAVENOUS PRN
Status: DISCONTINUED | OUTPATIENT
Start: 2021-09-14 | End: 2021-09-14

## 2021-09-14 RX ORDER — LIDOCAINE HYDROCHLORIDE 20 MG/ML
INJECTION, SOLUTION INFILTRATION; PERINEURAL PRN
Status: DISCONTINUED | OUTPATIENT
Start: 2021-09-14 | End: 2021-09-14

## 2021-09-14 RX ORDER — CEFAZOLIN SODIUM 2 G/100ML
2 INJECTION, SOLUTION INTRAVENOUS
Status: COMPLETED | OUTPATIENT
Start: 2021-09-14 | End: 2021-09-14

## 2021-09-14 RX ORDER — KETOROLAC TROMETHAMINE 30 MG/ML
INJECTION, SOLUTION INTRAMUSCULAR; INTRAVENOUS PRN
Status: DISCONTINUED | OUTPATIENT
Start: 2021-09-14 | End: 2021-09-14

## 2021-09-14 RX ORDER — FENTANYL CITRATE 50 UG/ML
INJECTION, SOLUTION INTRAMUSCULAR; INTRAVENOUS PRN
Status: DISCONTINUED | OUTPATIENT
Start: 2021-09-14 | End: 2021-09-14

## 2021-09-14 RX ADMIN — GLYCOPYRROLATE 0.2 MG: 0.2 INJECTION, SOLUTION INTRAMUSCULAR; INTRAVENOUS at 07:11

## 2021-09-14 RX ADMIN — MAGNESIUM SULFATE HEPTAHYDRATE 4 G: 40 INJECTION, SOLUTION INTRAVENOUS at 06:51

## 2021-09-14 RX ADMIN — DEXAMETHASONE SODIUM PHOSPHATE 4 MG: 10 INJECTION, SOLUTION INTRAMUSCULAR; INTRAVENOUS at 07:24

## 2021-09-14 RX ADMIN — LIDOCAINE HYDROCHLORIDE 60 MG: 20 INJECTION, SOLUTION INFILTRATION; PERINEURAL at 07:11

## 2021-09-14 RX ADMIN — ACETAMINOPHEN 975 MG: 325 TABLET ORAL at 06:44

## 2021-09-14 RX ADMIN — SODIUM CHLORIDE, SODIUM LACTATE, POTASSIUM CHLORIDE, CALCIUM CHLORIDE: 600; 310; 30; 20 INJECTION, SOLUTION INTRAVENOUS at 06:51

## 2021-09-14 RX ADMIN — PROPOFOL 200 MCG/KG/MIN: 10 INJECTION, EMULSION INTRAVENOUS at 07:11

## 2021-09-14 RX ADMIN — SODIUM CHLORIDE, SODIUM LACTATE, POTASSIUM CHLORIDE, CALCIUM CHLORIDE: 600; 310; 30; 20 INJECTION, SOLUTION INTRAVENOUS at 07:55

## 2021-09-14 RX ADMIN — ONDANSETRON 4 MG: 2 INJECTION INTRAMUSCULAR; INTRAVENOUS at 08:27

## 2021-09-14 RX ADMIN — FENTANYL CITRATE 50 MCG: 50 INJECTION, SOLUTION INTRAMUSCULAR; INTRAVENOUS at 07:11

## 2021-09-14 RX ADMIN — FENTANYL CITRATE 50 MCG: 50 INJECTION, SOLUTION INTRAMUSCULAR; INTRAVENOUS at 07:21

## 2021-09-14 RX ADMIN — KETOROLAC TROMETHAMINE 15 MG: 30 INJECTION, SOLUTION INTRAMUSCULAR; INTRAVENOUS at 08:27

## 2021-09-14 RX ADMIN — CEFAZOLIN SODIUM 2 G: 2 INJECTION, SOLUTION INTRAVENOUS at 07:05

## 2021-09-14 NOTE — ANESTHESIA PREPROCEDURE EVALUATION
Anesthesia Pre-Procedure Evaluation    Patient: Brad Leo   MRN: [id number] : 1975        Preoperative Diagnosis: Overactive bladder [N32.81]  Incontinence of feces, unspecified fecal incontinence type [R15.9]  Stress incontinence [N39.3]   Procedure : Procedure(s):  FULL IMPLANT OF SACRAL NEUROSTIMULATOR LEAD AND NEUROSTIMULATOR PLACEMENT OF ALTIS MIDURETHRAL SLING CYSTOSCOPY     Past Medical History:   Diagnosis Date     Gastroesophageal reflux disease      Motion sickness      Other chronic pain       Past Surgical History:   Procedure Laterality Date     DISCECTOMY LUMBAR ANTERIOR      L5-S1     HYSTERECTOMY  ?    partial      Allergies   Allergen Reactions     Hydrocodone Other (See Comments)     Comment: vomiting, Description:      Oxycodone      Vancomycin      Other reaction(s): Kamran Syndrome      Social History     Tobacco Use     Smoking status: Former Smoker     Smokeless tobacco: Never Used   Substance Use Topics     Alcohol use: Yes      Wt Readings from Last 1 Encounters:   18 63 kg (139 lb)        Anesthesia Evaluation   Pt has had prior anesthetic.     No history of anesthetic complications       ROS/MED HX  ENT/Pulmonary:  - neg pulmonary ROS     Neurologic:  - neg neurologic ROS     Cardiovascular:  - neg cardiovascular ROS     METS/Exercise Tolerance: >4 METS    Hematologic:  - neg hematologic  ROS     Musculoskeletal:       GI/Hepatic: Comment: Fecal incontinence    (+) GERD, Asymptomatic on medication,     Renal/Genitourinary: Comment: Stress incontinence      Endo:  - neg endo ROS     Psychiatric/Substance Use:     (+) psychiatric history anxiety and depression     Infectious Disease:  - neg infectious disease ROS     Malignancy:       Other:      (+) , H/O Chronic Pain,        Physical Exam    Airway      Comment: Overbite    Mallampati: I   TM distance: > 3 FB   Neck ROM: full   Mouth opening: > 3 cm    Respiratory Devices and Support         Dental  no notable dental  history         Cardiovascular   cardiovascular exam normal          Pulmonary   pulmonary exam normal                OUTSIDE LABS:  CBC:   Lab Results   Component Value Date    WBC 6.1 08/15/2013    WBC 8.7 04/16/2013    HGB 14.3 08/15/2013    HGB 14.0 04/16/2013    HCT 43.2 08/15/2013    HCT 41.7 04/16/2013     08/15/2013     04/16/2013     BMP:   Lab Results   Component Value Date    POTASSIUM 3.8 04/16/2013     COAGS: No results found for: PTT, INR, FIBR  POC: No results found for: BGM, HCG, HCGS  HEPATIC: No results found for: ALBUMIN, PROTTOTAL, ALT, AST, GGT, ALKPHOS, BILITOTAL, BILIDIRECT, IRENA  OTHER: No results found for: PH, LACT, A1C, HAROLDO, PHOS, MAG, LIPASE, AMYLASE, TSH, T4, T3, CRP, SED    Anesthesia Plan    ASA Status:  2      Anesthesia Type: MAC (MAC per surgeon).   Induction: Intravenous, Propofol.   Maintenance: TIVA.        Consents    Anesthesia Plan(s) and associated risks, benefits, and realistic alternatives discussed. Questions answered and patient/representative(s) expressed understanding.     - Discussed with:  Patient      - Extended Intubation/Ventilatory Support Discussed: No.      - Patient is DNR/DNI Status: No    Use of blood products discussed: No .     Postoperative Care    Pain management: IV analgesics, Oral pain medications, Multi-modal analgesia.   PONV prophylaxis: Ondansetron (or other 5HT-3), Dexamethasone or Solumedrol     Comments:      Plan for propofol gtt w/ ketamine / fentanyl PRN for pain.  Discussed potential need to convert to GA w/ ETT vs LMA if not tolerating.  Explained that it is possible to remember certain aspects of the OR experience during MAC dependent on the depth of sedation and patient understands this.  Decadron and zofran for PONV ppx.                Harriet Ibarra MD

## 2021-09-14 NOTE — H&P
I have reviewed the surgical (or preoperative) H&P that is linked to this encounter, and examined the patient. There are no significant changes   H&P in care everywhere    Dr. Nkechi Llamas  [facility]   Female Pelvic Medicine and Reconstructive Surgery/Urogynecology

## 2021-09-14 NOTE — ANESTHESIA POSTPROCEDURE EVALUATION
Patient: Brad Leo    Procedure(s):  FULL IMPLANT OF SACRAL NEUROSTIMULATOR LEAD AND NEUROSTIMULATOR PLACEMENT OF ALTIS MIDURETHRAL SLING CYSTOSCOPY    Diagnosis:Overactive bladder [N32.81]  Incontinence of feces, unspecified fecal incontinence type [R15.9]  Stress incontinence [N39.3]  Diagnosis Additional Information: No value filed.    Anesthesia Type:  General    Note:     Postop Pain Control: Uneventful            Sign Out: Well controlled pain   PONV: No   Neuro/Psych: Uneventful            Sign Out: Acceptable/Baseline neuro status   Airway/Respiratory: Uneventful            Sign Out: AIRWAY IN SITU/Resp. Support   CV/Hemodynamics: Uneventful            Sign Out: Acceptable CV status; No obvious hypovolemia; No obvious fluid overload   Other NRE: NONE   DID A NON-ROUTINE EVENT OCCUR? No           Last vitals:  Vitals Value Taken Time   /69 09/14/21 0900   Temp 96.9  F (36.1  C) 09/14/21 0839   Pulse 84 09/14/21 0905   Resp 16 09/14/21 0839   SpO2 99 % 09/14/21 0905   Vitals shown include unvalidated device data.    Electronically Signed By: Camille Friedman MD  September 14, 2021  9:07 AM

## 2021-09-14 NOTE — DISCHARGE INSTRUCTIONS
You received 975 mg of acetaminophen (Tylenol) at 644am. Do not exceed 4,000 mg of acetaminophen during a 24 hour period and keep in mind that acetaminophen can also be found in many over-the-counter cold medications as well as narcotics that may be given for pain.     You received a medication called Toradol (a stronger IV ibuprofen) at 8:27 AM. Do NOT take any Ibuprofen / Advil / Aleve / Naproxen or products containing Ibuprofen until 2:27 PM or later.    If you have any questions or concerns regarding your procedure, please contact Dr. Llamas, their office number is [phone].

## 2021-09-14 NOTE — OP NOTE
[facility]  Operative Note    Pre-operative diagnosis: Overactive bladder [N32.81]  Incontinence of feces, unspecified fecal incontinence type [R15.9]  Stress incontinence [N39.3]   Post-operative diagnosis Same   Procedure: Procedure(s):  FULL IMPLANT OF SACRAL NEUROSTIMULATOR LEAD AND NEUROSTIMULATOR PLACEMENT OF ALTIS MIDURETHRAL SLING CYSTOSCOPY   Surgeon: Nkechi Llamas MD   Assistants(s): None   Anesthesia: General    Estimated blood loss: 20mL    Total IV fluids: (See anesthesia record)   Blood transfusion: No transfusion was given during surgery   Total urine output: (See anesthesia record)   Drains: Bahena catheter   Specimens: None   Implants: Implant Name Type Inv. Item Serial No.  Lot No. LRB No. Used Action   SYSTEM, SLING, SINGLE INCISION, ALTIS [id number] - [id number] Stent SYSTEM, SLING, SINGLE INCISION, ALTIS [id number]  CONTOUR FA [id number] N/A 1 Implanted   Medtronic Nerve Stimulator     [id number] N/A 1 Implanted   interstim micro surescan MRI battery     [id number] N/A 1 Implanted        Findings: On cystoscopy after placement of the mid urethral sling the bladder was normal without lesion there was brisk bilateral efflux of urine from the bilateral ureters which are in the normal anatomic position.  There was no foreign body, trabeculation, mass, diverticula or any other abnormality noted in the bladder.  The urethra was normal without lesion there was no polyps, fronds, injury from the procedure.     On fluoroscopy after placement of the needle we were noted to be in the S3 foramen.  After deploying the lead leads II and III hovered over the inferior aspect of the sacrum through the S3 foramina.  PA view of with the C-arm confirmed a medial to lateral configuration of the lead with the lead starting in the medial aspect of the foramen     Complications: None.   Condition: Stable       Description of procedure:    The patient was seen in preop prior to the procedure where again we  reviewed risks, benefits and alternatives and consent for the procedure was signed. She was then taken to the operating room and placed on the OR table. She was placed under MAC anesthesia without complication. She was then placed in dorsal lithotomy position in yellow fin stirrups with care to avoid tension on the joints or any hyperflexion. Exam under anesthesia was then performed and she was prepped and draped in the usual sterile fashion.    A soto catheter was inserted at the beginning the case.  The mid urethra was identified and grasped in the midline with Allis clamps.  7 cc of 1% lidocaine with epinephrine were injected into the area of planned incision.  A 2 cm vertical incision was made in the vaginal epithelium under the mid urethra.  Metzenbaum scissors were used to dissect bilateral periurethral tunnels to the ipsilateral pubic rami, this was done bilaterally.    The sling was then opened and the right helical trocar was placed in through the static anchor, the anchor was then deployed into the obturator membrane and the superior medial aspect, this was done without complication.  The left helical trocar was then placed through the dynamic anchor this was deployed through the left obturator membrane in the superior medial portion of the membrane.  This again was done without complication.  The sling was then tensioned to ensure tension-free placement that was flush against the underlying urethra.  The sling was not rolled, vaginal sulcus were inspected and no perforation was noted the the dynamic anchor suture was then cut.  The incision was irrigated and closed with a running suture of 2-0 Vicryl.    Cystoscopy was then performed confirming no injury from the procedure, normal bladder, normal urethra. The catheter was reinserted.    At this point the patient was removed from lithotomy position transferred back to the stretcher and then transferred to the operating room bed in prone position for the  second part of the procedure the implant of the sacral nerve stimulator and lead.  This was done without complication care was taken throughout this to ensure we did not put tension on the Bahena catheter that was inserted. Pillows were placed under lower abdomen to flatten the sacrum and under shins to allow the toes to dangle freely. A ground pad was placed on the bottom of the patient's foot and the proximal ends of the test stimulation cable were connected to the ground pad and the ENS. Patient was prepped and draped in usual sterile fashion.    The c-arm was moved into PA position to provide fluoroscopic guidance of thesacrum. The medial edges of the  foramina were identified and marked. The c-arm was then moved into the lateral position to identify the S3  foramen. Once the needle entry point was determined, local injection of 1% lidocaine was administered. A 3.5 inch  foramen needle was placed in the superior, medial aspect of the S3 foramen and appropriate needle depth was visualized utilizing fluoroscopy.  Proper S3 needle location was also confirmed by direct observation of the lifting of the perineum or  bellowing,   and plantar flexion of the great toe utilizing the test stimulation cable,the ENS and .  The foramen needle stylet was removed and a directional guide was placed through the needle using markers on  the guide to assure appropriate depth. The foramen needle was removed by sliding over the directional guide. A  small incision was made peripherally to the directional guide through the skin. The lead introducer with dilator was placed over the directional guide and utilizing fluoroscopic guidance, the lead introducer was advanced until the radiopaque disha was half-way through the foramen. The dilator was removed along with the directional guide.   Using fluoroscopy, the tined leadwith bent tipped stylet was placed through the introducer until electrodes two and three straddled the  anterior surface of the sacrum. All four electrodes were tested,  observing  luli and plantar flexion of the great toe utilizing the test stimulation cable, ENS and .    After satisfactory lead positioning was confirmed, the introducer was retracted over the lead undercontinuous  fluoroscopy, deploying the tines into presacral tissue. Retesting of all four electrodes confirming appropriate  responses was completed.  The INS pocket site was identified below the iliac crestand lateral to the sacrum. Local was administered and an  incision was made into the subcutaneous tissue. Blunt dissection was used to create a pocket with hemostasis  Achieved. A tunneling tool with sheath was placed from the lead insertion site subcutaneously to the incised pocket site. The tunneling tool was removed and the lead was fed through the sheath, exiting at the pocket site. The sheath was removed. The lead was cleaned and dried.   The lead was inserted into the header of the InterStim  II recharge-free neurostimulator. The singleset screw was tightened using the torque wrench until audible click was heard.  The neurostimulator was placed into the pocket with the excess lead placed around the neurostimulator.  The communicator, covered in a sterile sleeve, was placed over the implanted neurostimulator to ensure proper  lead connection and that parameters were within normal range.  Impedances were confirmed to be withinnormal limits, greater than 50 and less than 4,000 ohms.  The wound was closed with interrupted subcutaneous sutures using 2-0 vicryl and and 4-0 monocryl subcuticular skin sutures. The lead entry site was closed with an interrupted suture of 4-0 monocryl. The incisions were then sealed with dermabond skin glue.    Counts were correct. The patient was transferred to PACU in satisfactory condition.   Patient was provided utilization instructions for the patient  (and recharger, if  applicable) prior to  discharge       Sponge, needle, instrument counts were correct at the end of the procedure.  I was present and scrubbed for the procedure in entirety.  The patient tolerated the procedure well without complication.  She was taken to the PACU in awake and stable condition with a Bahena catheter for postoperative voiding trial.      Dr. Nkechi Llamas  [facility]   Female Pelvic Medicine and Reconstructive Surgery/Urogynecology

## 2021-09-14 NOTE — ANESTHESIA CARE TRANSFER NOTE
Patient: Brad Leo    Procedure(s):  FULL IMPLANT OF SACRAL NEUROSTIMULATOR LEAD AND NEUROSTIMULATOR PLACEMENT OF ALTIS MIDURETHRAL SLING CYSTOSCOPY    Diagnosis: Overactive bladder [N32.81]  Incontinence of feces, unspecified fecal incontinence type [R15.9]  Stress incontinence [N39.3]  Diagnosis Additional Information: No value filed.    Anesthesia Type:   General     Note:    Oropharynx: oropharynx clear of all foreign objects  Level of Consciousness: drowsy  Oxygen Supplementation: room air    Independent Airway: airway patency satisfactory and stable  Dentition: dentition unchanged  Vital Signs Stable: post-procedure vital signs reviewed and stable  Report to RN Given: handoff report given  Patient transferred to: Phase II    Handoff Report: Identifed the Patient, Identified the Reponsible Provider, Reviewed the pertinent medical history, Discussed the surgical course, Reviewed Intra-OP anesthesia mangement and issues during anesthesia, Set expectations for post-procedure period and Allowed opportunity for questions and acknowledgement of understanding      Vitals:  Vitals Value Taken Time   /61 09/14/21 0839   Temp 96.9  F (36.1  C) 09/14/21 0839   Pulse 88 09/14/21 0839   Resp 16 09/14/21 0839   SpO2 97 % 09/14/21 0839       Electronically Signed By: DEO Beckford CRNA  September 14, 2021  8:41 AM

## 2023-08-22 ENCOUNTER — TELEPHONE (OUTPATIENT)
Dept: NEUROLOGY | Facility: CLINIC | Age: 48
End: 2023-08-22

## 2023-08-25 ENCOUNTER — TRANSCRIBE ORDERS (OUTPATIENT)
Dept: OTHER | Age: 48
End: 2023-08-25